# Patient Record
Sex: FEMALE | Race: WHITE | URBAN - NONMETROPOLITAN AREA
[De-identification: names, ages, dates, MRNs, and addresses within clinical notes are randomized per-mention and may not be internally consistent; named-entity substitution may affect disease eponyms.]

---

## 2022-04-13 ENCOUNTER — OFFICE VISIT (OUTPATIENT)
Dept: ORTHOPEDIC SURGERY | Age: 77
End: 2022-04-13
Payer: MEDICARE

## 2022-04-13 VITALS — BODY MASS INDEX: 23.92 KG/M2 | WEIGHT: 135 LBS | HEIGHT: 63 IN

## 2022-04-13 DIAGNOSIS — M17.12 PRIMARY OSTEOARTHRITIS OF LEFT KNEE: ICD-10-CM

## 2022-04-13 DIAGNOSIS — M25.562 LEFT KNEE PAIN, UNSPECIFIED CHRONICITY: Primary | ICD-10-CM

## 2022-04-13 DIAGNOSIS — M25.562 LEFT KNEE PAIN, UNSPECIFIED CHRONICITY: ICD-10-CM

## 2022-04-13 PROCEDURE — 1090F PRES/ABSN URINE INCON ASSESS: CPT | Performed by: ORTHOPAEDIC SURGERY

## 2022-04-13 PROCEDURE — G8536 NO DOC ELDER MAL SCRN: HCPCS | Performed by: ORTHOPAEDIC SURGERY

## 2022-04-13 PROCEDURE — G8420 CALC BMI NORM PARAMETERS: HCPCS | Performed by: ORTHOPAEDIC SURGERY

## 2022-04-13 PROCEDURE — 3017F COLORECTAL CA SCREEN DOC REV: CPT | Performed by: ORTHOPAEDIC SURGERY

## 2022-04-13 PROCEDURE — 99204 OFFICE O/P NEW MOD 45 MIN: CPT | Performed by: ORTHOPAEDIC SURGERY

## 2022-04-13 PROCEDURE — 1101F PT FALLS ASSESS-DOCD LE1/YR: CPT | Performed by: ORTHOPAEDIC SURGERY

## 2022-04-13 PROCEDURE — G8400 PT W/DXA NO RESULTS DOC: HCPCS | Performed by: ORTHOPAEDIC SURGERY

## 2022-04-13 PROCEDURE — G8510 SCR DEP NEG, NO PLAN REQD: HCPCS | Performed by: ORTHOPAEDIC SURGERY

## 2022-04-13 PROCEDURE — G8427 DOCREV CUR MEDS BY ELIG CLIN: HCPCS | Performed by: ORTHOPAEDIC SURGERY

## 2022-04-13 RX ORDER — OMEPRAZOLE 40 MG/1
40 CAPSULE, DELAYED RELEASE ORAL DAILY
COMMUNITY
Start: 2022-03-21

## 2022-04-13 RX ORDER — ACETAMINOPHEN 500 MG
2000 TABLET ORAL DAILY
COMMUNITY

## 2022-04-13 RX ORDER — MEDROXYPROGESTERONE ACETATE 2.5 MG/1
2.5 TABLET ORAL DAILY
COMMUNITY
Start: 2022-02-09

## 2022-04-13 RX ORDER — ESTRADIOL 0.5 MG/1
0.5 TABLET ORAL DAILY
COMMUNITY
Start: 2022-02-22

## 2022-04-13 RX ORDER — MELOXICAM 15 MG/1
TABLET ORAL
COMMUNITY
Start: 2022-04-04

## 2022-04-13 RX ORDER — TRIAMCINOLONE ACETONIDE 55 UG/1
SPRAY, METERED NASAL
COMMUNITY
Start: 2022-02-03

## 2022-04-13 RX ORDER — SERTRALINE HYDROCHLORIDE 100 MG/1
100 TABLET, FILM COATED ORAL DAILY
COMMUNITY
Start: 2022-02-23

## 2022-04-13 NOTE — PROGRESS NOTES
Name: Lilia Ortiz    : 1945     Service Dept: [address] [place] [address] and Sports Medicine    Chief Complaint   Patient presents with    Knee Pain        Visit Vitals  Ht 5' 3\" (1.6 m)   Wt 135 lb (61.2 kg)   BMI 23.91 kg/m²        No Known Allergies     Current Outpatient Medications   Medication Sig Dispense Refill    cholecalciferol (VITAMIN D3) (2,000 UNITS /50 MCG) cap capsule Take 2,000 Units by mouth daily.  estradioL (ESTRACE) 0.5 mg tablet Take 0.5 mg by mouth daily.  medroxyPROGESTERone (PROVERA) 2.5 mg tablet Take 2.5 mg by mouth daily.  meloxicam (MOBIC) 15 mg tablet TAKE 1 TABLET EVERY DAY BY ORAL ROUTE AS NEEDED.  fish oil-omega-3 fatty acids 340-1,000 mg capsule Take 1 Capsule by mouth two (2) times a day.  omeprazole (PRILOSEC) 40 mg capsule Take 40 mg by mouth daily.  sertraline (ZOLOFT) 100 mg tablet Take 100 mg by mouth daily.  triamcinolone (NASACORT AQ) 55 mcg nasal inhaler INSTILL 2 SPRAYS BY INTRANASAL ROUTE EVERY DAY        There is no problem list on file for this patient. History reviewed. No pertinent family history. Social History     Socioeconomic History    Marital status:    Tobacco Use    Smoking status: Never Smoker    Smokeless tobacco: Never Used   Substance and Sexual Activity    Alcohol use: Not Currently      History reviewed. No pertinent surgical history. History reviewed. No pertinent past medical history. I have reviewed and agree with [address] [place] [address] Nw and ROS and intake form in chart and the record furthermore I have reviewed prior medical record(s) regarding this patients care during this appointment. Review of Systems:   Patient is a pleasant appearing individual, appropriately dressed, well hydrated, well nourished, who is alert, appropriately oriented for age, and in no acute distress with a normal gait and normal affect who does not appear to be in any significant pain.      Physical Exam:  Left Knee -Decrease range of motion with flexion, Knee arc of greater than 50 degrees, Some crepitation, Grossly neurovascularly intact, Good cap refill, No skin lesion, Moderate swelling, No gross instability, Some quadriceps weakness, Kellgren and [place] at least grade 3    Right Knee - Full Range of Motion, No crepitation, Grossly neurovascularly intact, Good cap refill, No skin lesion, No swelling, No gross instability, No quadriceps weakness     Encounter Diagnoses     ICD-10-CM ICD-9-CM   1. Left knee pain, unspecified chronicity  M25.562 719.46   2. Primary osteoarthritis of left knee  M17.12 715.16       HPI:  The patient is here with a chief complaint of left knee pain, throbbing, burning pain. X-rays are positive for severe OA, failed conservative treatment including cortisone injection. Assessment/Plan:  Plan will be for left knee replacement, general medical clearance, outpatient surgery and we will go from there. As part of continued conservative pain management options the patient was advised to utilize Tylenol or OTC NSAIDS as long as it is not medically contraindicated. Return to Office: Follow-up and Dispositions    · Return for [facility]. Scribed by Anton Sellers as dictated by Michael Cruz. Linsey Lai MD.  Documentation True and Accepted Duke Lai MD

## 2022-04-13 NOTE — PATIENT INSTRUCTIONS
Knee Arthritis: Care Instructions  Your Care Instructions     Knee arthritis is a breakdown of the cartilage that cushions your knee joint. When the cartilage wears down, your bones rub against each other. This causes pain and stiffness. Knee arthritis tends to get worse with time. Treatment for knee arthritis involves reducing pain, making the leg muscles stronger, and staying at a healthy body weight. The treatment usually does not improve the health of the cartilage, but it can reduce pain and improve how well your knee works. You can take simple measures to protect your knee joints, ease your pain, and help you stay active. Follow-up care is a key part of your treatment and safety. Be sure to make and go to all appointments, and call your doctor if you are having problems. It's also a good idea to know your test results and keep a list of the medicines you take. How can you care for yourself at home? · Know that knee arthritis will cause more pain on some days than on others. · Stay at a healthy weight. Lose weight if you are overweight. When you stand up, the pressure on your knees from every pound of body weight is multiplied four times. So if you lose 10 pounds, you will reduce the pressure on your knees by 40 pounds. · Talk to your doctor or physical therapist about exercises that will help ease joint pain. ? Stretch to help prevent stiffness and to prevent injury before you exercise. You may enjoy gentle forms of yoga to help keep your knee joints and muscles flexible. ? Walk instead of jog.  ? Ride a bike. This makes your thigh muscles stronger and takes pressure off your knee. ? Wear well-fitting and comfortable shoes. ? Exercise in chest-deep water. This can help you exercise longer with less pain. ? Avoid exercises that include squatting or kneeling. They can put a lot of strain on your knees.   ? Talk to your doctor to make sure that the exercise you do is not making the arthritis worse.  · Do not sit for long periods of time. Try to walk once in a while to keep your knee from getting stiff. · Ask your doctor or physical therapist whether shoe inserts may reduce your arthritis pain. · If you can afford it, get new athletic shoes at least every year. This can help reduce the strain on your knees. · Use a device to help you do everyday activities. ? A cane or walking stick can help you keep your balance when you walk. Hold the cane or walking stick in the hand opposite the painful knee. ? If you feel like you may fall when you walk, try using crutches or a front-wheeled walker. These can prevent falls that could cause more damage to your knee. ? A knee brace may help keep your knee stable and prevent pain. ? You also can use other things to make life easier, such as a higher toilet seat and handrails in the bathtub or shower. · Take pain medicines exactly as directed. ? Do not wait until you are in severe pain. You will get better results if you take it sooner. ? If you are not taking a prescription pain medicine, take an over-the-counter medicine such as acetaminophen (Tylenol), ibuprofen (Advil, Motrin), or naproxen (Aleve). Read and follow all instructions on the label. ? Do not take two or more pain medicines at the same time unless the doctor told you to. Many pain medicines have acetaminophen, which is Tylenol. Too much acetaminophen (Tylenol) can be harmful. ? Tell your doctor if you take a blood thinner, have diabetes, or have allergies to shellfish. · Ask your doctor if you might benefit from a shot of steroid medicine into your knee. This may provide pain relief for several months. · Many people take the supplements glucosamine and chondroitin for osteoarthritis. Some people feel they help, but the medical research does not show that they work. Talk to your doctor before you take these supplements. When should you call for help?    Call your doctor now or seek immediate medical care if:    · You have sudden swelling, warmth, or pain in your knee.     · You have knee pain and a fever or rash.     · You have such bad pain that you cannot use your knee. Watch closely for changes in your health, and be sure to contact your doctor if you have any problems. Where can you learn more? Go to http://www.gray.com/  Enter W187 in the search box to learn more about \"Knee Arthritis: Care Instructions. \"  Current as of: December 20, 2021               Content Version: 13.2  © 0962-2603 [employer]. Care instructions adapted under license by [employer] (which disclaims liability or warranty for this information). If you have questions about a medical condition or this instruction, always ask your healthcare professional. [address] any warranty or liability for your use of this information.